# Patient Record
Sex: FEMALE | Race: WHITE
[De-identification: names, ages, dates, MRNs, and addresses within clinical notes are randomized per-mention and may not be internally consistent; named-entity substitution may affect disease eponyms.]

---

## 2017-03-02 ENCOUNTER — HOSPITAL ENCOUNTER (OUTPATIENT)
Dept: HOSPITAL 62 - WI | Age: 57
End: 2017-03-02
Attending: NURSE PRACTITIONER
Payer: COMMERCIAL

## 2017-03-02 DIAGNOSIS — Z12.31: Primary | ICD-10-CM

## 2017-03-02 PROCEDURE — G0202 SCR MAMMO BI INCL CAD: HCPCS

## 2017-03-02 PROCEDURE — 77067 SCR MAMMO BI INCL CAD: CPT

## 2017-10-14 ENCOUNTER — HOSPITAL ENCOUNTER (EMERGENCY)
Dept: HOSPITAL 62 - ER | Age: 57
Discharge: HOME | End: 2017-10-14
Payer: COMMERCIAL

## 2017-10-14 VITALS — DIASTOLIC BLOOD PRESSURE: 67 MMHG | SYSTOLIC BLOOD PRESSURE: 138 MMHG

## 2017-10-14 DIAGNOSIS — R07.9: ICD-10-CM

## 2017-10-14 DIAGNOSIS — I25.10: ICD-10-CM

## 2017-10-14 DIAGNOSIS — Z88.8: ICD-10-CM

## 2017-10-14 DIAGNOSIS — G89.29: ICD-10-CM

## 2017-10-14 DIAGNOSIS — M72.2: ICD-10-CM

## 2017-10-14 DIAGNOSIS — M54.89: ICD-10-CM

## 2017-10-14 DIAGNOSIS — R51: ICD-10-CM

## 2017-10-14 DIAGNOSIS — R10.816: ICD-10-CM

## 2017-10-14 DIAGNOSIS — J18.1: Primary | ICD-10-CM

## 2017-10-14 DIAGNOSIS — R74.8: ICD-10-CM

## 2017-10-14 DIAGNOSIS — Z79.891: ICD-10-CM

## 2017-10-14 DIAGNOSIS — Z87.19: ICD-10-CM

## 2017-10-14 DIAGNOSIS — F17.200: ICD-10-CM

## 2017-10-14 LAB
ALBUMIN SERPL-MCNC: 4.3 G/DL (ref 3.5–5)
ALP SERPL-CCNC: 48 U/L (ref 38–126)
ALT SERPL-CCNC: 39 U/L (ref 9–52)
ANION GAP SERPL CALC-SCNC: 11 MMOL/L (ref 5–19)
AST SERPL-CCNC: 26 U/L (ref 14–36)
BASOPHILS # BLD AUTO: 0.1 10^3/UL (ref 0–0.2)
BASOPHILS NFR BLD AUTO: 0.8 % (ref 0–2)
BILIRUB DIRECT SERPL-MCNC: 0.3 MG/DL (ref 0–0.4)
BILIRUB SERPL-MCNC: 0.3 MG/DL (ref 0.2–1.3)
BUN SERPL-MCNC: 14 MG/DL (ref 7–20)
CALCIUM: 9.9 MG/DL (ref 8.4–10.2)
CHLORIDE SERPL-SCNC: 107 MMOL/L (ref 98–107)
CO2 SERPL-SCNC: 24 MMOL/L (ref 22–30)
CREAT SERPL-MCNC: 0.84 MG/DL (ref 0.52–1.25)
EOSINOPHIL # BLD AUTO: 0.2 10^3/UL (ref 0–0.6)
EOSINOPHIL NFR BLD AUTO: 2.2 % (ref 0–6)
ERYTHROCYTE [DISTWIDTH] IN BLOOD BY AUTOMATED COUNT: 13.4 % (ref 11.5–14)
GLUCOSE SERPL-MCNC: 110 MG/DL (ref 75–110)
HCT VFR BLD CALC: 38.5 % (ref 36–47)
HGB BLD-MCNC: 13 G/DL (ref 12–15.5)
HGB HCT DIFFERENCE: 0.5
LIPASE SERPL-CCNC: 398.9 U/L (ref 23–300)
LYMPHOCYTES # BLD AUTO: 3.1 10^3/UL (ref 0.5–4.7)
LYMPHOCYTES NFR BLD AUTO: 39 % (ref 13–45)
MCH RBC QN AUTO: 30.1 PG (ref 27–33.4)
MCHC RBC AUTO-ENTMCNC: 33.9 G/DL (ref 32–36)
MCV RBC AUTO: 89 FL (ref 80–97)
MONOCYTES # BLD AUTO: 0.4 10^3/UL (ref 0.1–1.4)
MONOCYTES NFR BLD AUTO: 4.9 % (ref 3–13)
NEUTROPHILS # BLD AUTO: 4.2 10^3/UL (ref 1.7–8.2)
NEUTS SEG NFR BLD AUTO: 53.1 % (ref 42–78)
POTASSIUM SERPL-SCNC: 4.7 MMOL/L (ref 3.6–5)
PROT SERPL-MCNC: 7.1 G/DL (ref 6.3–8.2)
RBC # BLD AUTO: 4.33 10^6/UL (ref 3.72–5.28)
SODIUM SERPL-SCNC: 142.2 MMOL/L (ref 137–145)
WBC # BLD AUTO: 8 10^3/UL (ref 4–10.5)

## 2017-10-14 PROCEDURE — 36415 COLL VENOUS BLD VENIPUNCTURE: CPT

## 2017-10-14 PROCEDURE — 71020: CPT

## 2017-10-14 PROCEDURE — 84484 ASSAY OF TROPONIN QUANT: CPT

## 2017-10-14 PROCEDURE — 93010 ELECTROCARDIOGRAM REPORT: CPT

## 2017-10-14 PROCEDURE — 99284 EMERGENCY DEPT VISIT MOD MDM: CPT

## 2017-10-14 PROCEDURE — 85025 COMPLETE CBC W/AUTO DIFF WBC: CPT

## 2017-10-14 PROCEDURE — 80053 COMPREHEN METABOLIC PANEL: CPT

## 2017-10-14 PROCEDURE — 93005 ELECTROCARDIOGRAM TRACING: CPT

## 2017-10-14 PROCEDURE — 83690 ASSAY OF LIPASE: CPT

## 2017-10-14 NOTE — RADIOLOGY REPORT (SQ)
EXAM DESCRIPTION:  CHEST PA/LAT



COMPLETED DATE/TIME:  10/14/2017 9:56 am



REASON FOR STUDY:  cp



COMPARISON:  Chest film 2/19/2012, 5/12/2014, 6/20/2015

CT chest 5/9/2014



EXAM PARAMETERS:  NUMBER OF VIEWS: two views

TECHNIQUE: Digital Frontal and Lateral radiographic views of the chest acquired.

RADIATION DOSE: NA

LIMITATIONS: none



FINDINGS:  LUNGS AND PLEURA: Mild airspace disease in the medial segment right middle lobe, marked wi
th a [place] on the frontal and lateral view.

Minimal lingular scarring or atelectasis.

No pleural effusion.  No pneumothorax.

MEDIASTINUM AND HILAR STRUCTURES: Densely calcified left hilar and sub- carinal lymph nodes.  These a
re stable dating back to 2012

HEART AND VASCULAR STRUCTURES: Heart normal size.  No evidence for failure.

BONES: No acute findings.

HARDWARE: Clips right upper quadrant post cholecystectomy

OTHER: No other significant finding.



IMPRESSION:  Airspace disease with air bronchograms in the medial segment right middle lobe.  This is
 worrisome for pneumonia.



TECHNICAL DOCUMENTATION:  JOB ID:  [id number]

 2011 [employer]- All Rights Reserved

## 2017-10-14 NOTE — EKG REPORT
SEVERITY:- ABNORMAL ECG -

SINUS RHYTHM

LAD, CONSIDER LEFT ANTERIOR FASCICULAR BLOCK

LOW VOLTAGE IN FRONTAL LEADS

:

Confirmed by: Dudley White MD 14-Oct-2017 11:01:29

## 2017-10-14 NOTE — ER DOCUMENT REPORT
ED General





- General


Chief Complaint: Shortness Of Breath


Stated Complaint: BACK PAIN


Time Seen by Provider: 10/14/17 09:18


Mode of Arrival: Ambulatory


Notes: 





56 yo smoker, non htn, non dm, hyperlipidemic, non etoh, non drugs, female c/o 

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 

makes it worse, tums without relief, thought it was heartburn, radiates through 

to the back since tuesday, level 4/5. Breathing more shallow. Similar pain in 

2002, dx "pulled chest muscles". Also c/o headache for 3 weeks. Never had 

cardiac work up. 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 PCP: Asmita, pain 

management  seen on friday for DD, chronic back pain, plantar fasciitis (

hydrocodone 10mg tid) Fam hx: no CAD. Hx: cholecystectomy, diverticulitis, GERD

, chronic nausea.


TRAVEL OUTSIDE OF THE [place] IN LAST 30 DAYS: No





- Related Data


Allergies/Adverse Reactions: 


 





NSAIDS (Non-Steroidal Anti-Inflamma [Nsaids] Allergy (Severe, Verified 10/14/17 

09:10)


 gastric bleeding


prednisone [Prednisone] Adverse Reaction (Mild, Verified 10/14/17 09:10)


 Nausea











Past Medical History





- General


Information source: Patient





- Social History


Smoking Status: Current Every Day Smoker


Chew tobacco use (# tins/day): No


Frequency of alcohol use: None


Drug Abuse: None


Family History: None





- Past Medical History


Cardiac Medical History: Reports: Hx Coronary Artery Disease - hyperlipedemia, 

Hx Hypercholesterolemia


   Denies: Hx Heart Attack, Hx Hypertension


Pulmonary Medical History: 


   Denies: Hx Asthma, Hx Bronchitis, Hx COPD, Hx Pneumonia, Hx Tuberculosis


Neurological Medical History: Reports: Hx Migraine.  Denies: Hx Cerebrovascular 

Accident, Hx Seizures


Renal/ Medical History: Denies: Hx Peritoneal Dialysis


GI Medical History: Reports: Hx Gastroesophageal Reflux Disease


Musculoskeltal Medical History: Denies Hx Arthritis, Reports Hx Fibromyalgia


Psychiatric Medical History: Reports: Hx Anxiety, Hx Depression


Infectious Medical History: Reports: Hx C-Diff


Past Surgical History: Reports: Hx Cholecystectomy, Hx Hysterectomy, Hx 

Orthopedic Surgery - carpal tunnel x2, Hx Tonsillectomy.  Denies: Hx 

Appendectomy, Hx Pacemaker





- Immunizations


Immunizations up to date: Yes


Hx Diphtheria, Pertussis, Tetanus Vaccination: Yes - 01/01/2010


Hx Pneumococcal Vaccination: 01/01/13





Review of Systems





- Review of Systems


Constitutional: No symptoms reported


EENT: No symptoms reported


Cardiovascular: See HPI


Respiratory: No symptoms reported


Gastrointestinal: No symptoms reported


Genitourinary: No symptoms reported


Female Genitourinary: No symptoms reported


Musculoskeletal: See HPI


Skin: No symptoms reported


Hematologic/Lymphatic: No symptoms reported


Neurological/Psychological: No symptoms reported





Physical Exam





- Vital signs


Vitals: 


 











Temp Pulse Resp BP Pulse Ox


 


 99.2 F   86   15   130/87 H  95 


 


 10/14/17 09:10  10/14/17 09:10  10/14/17 09:10  10/14/17 09:10  10/14/17 09:10











Interpretation: Normal





- General


General appearance: Appears well, Alert





- HEENT


Head: Normocephalic, Atraumatic


Eyes: Normal


Conjunctiva: Normal


Pupils: PERRL


Neck: Supple.  No: Lymphadenopathy





- Respiratory


Respiratory status: No respiratory distress


Chest status: Tender - midline setrnum and right mid chestwall


Breath sounds: Normal


Chest palpation: Normal





- Cardiovascular


Rhythm: Regular


Heart sounds: Normal auscultation


Murmur: No





- Abdominal


Inspection: Normal


Distension: No distension


Bowel sounds: Normal


Tenderness: Nontender


Organomegaly: No organomegaly





- Back


Back: Normal, Nontender





- Extremities


General upper extremity: Normal inspection, Nontender, Normal color, Normal ROM

, Normal temperature


General lower extremity: Normal inspection, Nontender, Normal color, Normal ROM

, Normal temperature, Normal weight bearing.  No: Renate's sign





- Neurological


Neuro grossly intact: Yes


Cognition: Normal


Orientation: AAOx4


[place] Coma Scale Eye Opening: Spontaneous


[place] Coma Scale Verbal: Oriented


[place] Coma Scale Motor: Obeys Commands


[place] Coma Scale Total: 15


Speech: Normal


Motor strength normal: LUE, RUE, LLE, RLE


Sensory: Normal





- Psychological


Associated symptoms: Normal affect, Normal mood





- Skin


Skin Temperature: Warm


Skin Moisture: Dry


Skin Color: Normal





Course





- Re-evaluation


Re-evalutation: 





10/14/17 12:07


Consult Dr. Varma related to this patient.  In 2014 she had a CTA which showed 

her aorta as normal.  He feels like based on the history and physical exam that 

this is musculoskeletal right sided chest, right middle pneumonia,  and savita 

scapular pain and she can follow-up with her family practice doctor for the 

mildly elevated lipase.  The troponin is negative the EKG is normal sinus 

rhythm without ectopy or acute changes, the chest x-ray shows possible right 

middle lobe pneumonia which we will treat as well.


10/14/17 12:25


pt has had panccreatitis in the past, but she does not think it is that today,





- Vital Signs


Vital signs: 


 











Temp Pulse Resp BP Pulse Ox


 


 99.5 F   85   20   138/67 H  99 


 


 10/14/17 13:07  10/14/17 13:07  10/14/17 13:07  10/14/17 13:07  10/14/17 13:07














- Laboratory


Result Diagrams: 


 10/14/17 09:41





 10/14/17 09:41


Laboratory results interpreted by me: 


 











  10/14/17





  09:41


 


Lipase  398.9 H














Discharge





- Discharge


Clinical Impression: 


 periscapular back pain, Chest wall tenderness, Elevated lipase, mild 

epigastric tenderness





Right middle lobe pneumonia


Qualifiers:


 Pneumonia type: due to unspecified organism Qualified Code(s): J18.1 - Lobar 

pneumonia, unspecified organism





Condition: Good


Disposition: HOME, SELF-CARE


Instructions:  Chest Wall Pain ([facility]), Levofloxacin, Pneumonia ([facility]), Stop 

Smoking ([facility])


Additional Instructions: 


see your doctor for follow up , copy of labs given to you. the lipase is mildly 

elevated


to er if worse


stop smoking


warm compress to sore muscles





Please complete the patient satisfaction survey if you get one, and return it.. 

If you do not receive a survey,  then you can go to the [facility] website, [email or web address] 

and place your comments about your very good care. Thank you very much. It was 

a pleasure being your medical provider today.


Prescriptions: 


Fluconazole [Diflucan] 150 mg PO ONCE PRN #1 tablet


 PRN Reason: 


Levofloxacin 750 mg PO DAILY #6 tablet


Referrals: 


BEVERLY SIMMONS MD [Primary Care Provider] - 10/16/17

## 2017-10-14 NOTE — ER DOCUMENT REPORT
ED Medical Screen (RME)





- General


Chief Complaint: Shortness Of Breath


Stated Complaint: BACK PAIN


Time Seen by Provider: 10/14/17 09:18


Mode of Arrival: Ambulatory


Information source: Patient


TRAVEL OUTSIDE OF THE [place] IN LAST 30 DAYS: No





- HPI


Patient complains to provider of: Right-sided chest pain radiating into back


Onset: Yesterday


Notes: 





10/14/17 09:25


Patient is a 57-year-old female presenting to the emergency room today 

complaining of right-sided chest pain that radiates into her back, it started 

yesterday, she reports it hurts to take a deep breath, and therefore she feels 

short of breath, she denies any cough, cold or congestion, no nausea, vomiting 

or diarrhea, no fever or chills, patient is a smoker and reports a history of 

"spot on my lung"





- Related Data


Allergies/Adverse Reactions: 


 





NSAIDS (Non-Steroidal Anti-Inflamma [Nsaids] Allergy (Severe, Verified 10/14/17 

09:10)


 gastric bleeding


prednisone [Prednisone] Adverse Reaction (Mild, Verified 10/14/17 09:10)


 Nausea











Past Medical History





- Past Medical History


Cardiac Medical History: Reports: Hx Coronary Artery Disease - hyperlipedemia, 

Hx Hypercholesterolemia


   Denies: Hx Heart Attack, Hx Hypertension


Pulmonary Medical History: 


   Denies: Hx Asthma, Hx Bronchitis, Hx COPD, Hx Pneumonia, Hx Tuberculosis


Neurological Medical History: Reports: Hx Migraine.  Denies: Hx Cerebrovascular 

Accident, Hx Seizures


Renal/ Medical History: Denies: Hx Peritoneal Dialysis


GI Medical History: Reports: Hx Gastroesophageal Reflux Disease


Musculoskeltal Medical History: Denies Hx Arthritis, Reports Hx Fibromyalgia


Psychiatric Medical History: Reports: Hx Anxiety, Hx Depression


Infectious Medical History: Reports: Hx C-Diff


Past Surgical History: Reports: Hx Cholecystectomy, Hx Hysterectomy, Hx 

Orthopedic Surgery - carpal tunnel x2, Hx Tonsillectomy.  Denies: Hx 

Appendectomy, Hx Pacemaker





- Immunizations


Immunizations up to date: Yes


Hx Diphtheria, Pertussis, Tetanus Vaccination: Yes - 01/01/2010





Physical Exam





- Vital signs


Vitals: 





 











Temp Pulse Resp BP Pulse Ox


 


 99.2 F   86   15   130/87 H  95 


 


 10/14/17 09:10  10/14/17 09:10  10/14/17 09:10  10/14/17 09:10  10/14/17 09:10














Course





- Vital Signs


Vital signs: 





 











Temp Pulse Resp BP Pulse Ox


 


 99.2 F   86   15   130/87 H  95 


 


 10/14/17 09:10  10/14/17 09:10  10/14/17 09:10  10/14/17 09:10  10/14/17 09:10

## 2018-07-13 ENCOUNTER — HOSPITAL ENCOUNTER (OUTPATIENT)
Dept: HOSPITAL 62 - OD | Age: 58
End: 2018-07-13
Attending: INTERNAL MEDICINE
Payer: COMMERCIAL

## 2018-07-13 ENCOUNTER — HOSPITAL ENCOUNTER (OUTPATIENT)
Dept: HOSPITAL 62 - WI | Age: 58
End: 2018-07-13
Attending: INTERNAL MEDICINE
Payer: COMMERCIAL

## 2018-07-13 DIAGNOSIS — D64.9: ICD-10-CM

## 2018-07-13 DIAGNOSIS — Z12.31: Primary | ICD-10-CM

## 2018-07-13 DIAGNOSIS — E78.00: Primary | ICD-10-CM

## 2018-07-13 LAB
ADD MANUAL DIFF: NO
ALBUMIN SERPL-MCNC: 4.3 G/DL (ref 3.5–5)
ALP SERPL-CCNC: 52 U/L (ref 38–126)
ALT SERPL-CCNC: 52 U/L (ref 9–52)
ANION GAP SERPL CALC-SCNC: 12 MMOL/L (ref 5–19)
AST SERPL-CCNC: 35 U/L (ref 14–36)
BASOPHILS # BLD AUTO: 0.1 10^3/UL (ref 0–0.2)
BASOPHILS NFR BLD AUTO: 0.6 % (ref 0–2)
BILIRUB DIRECT SERPL-MCNC: 0.4 MG/DL (ref 0–0.4)
BILIRUB SERPL-MCNC: 0.7 MG/DL (ref 0.2–1.3)
BUN SERPL-MCNC: 17 MG/DL (ref 7–20)
CALCIUM: 9.9 MG/DL (ref 8.4–10.2)
CHLORIDE SERPL-SCNC: 104 MMOL/L (ref 98–107)
CO2 SERPL-SCNC: 27 MMOL/L (ref 22–30)
EOSINOPHIL # BLD AUTO: 0.2 10^3/UL (ref 0–0.6)
EOSINOPHIL NFR BLD AUTO: 2 % (ref 0–6)
ERYTHROCYTE [DISTWIDTH] IN BLOOD BY AUTOMATED COUNT: 13.6 % (ref 11.5–14)
GLUCOSE SERPL-MCNC: 94 MG/DL (ref 75–110)
HCT VFR BLD CALC: 37.2 % (ref 36–47)
HGB BLD-MCNC: 12.9 G/DL (ref 12–15.5)
LYMPHOCYTES # BLD AUTO: 2.8 10^3/UL (ref 0.5–4.7)
LYMPHOCYTES NFR BLD AUTO: 31.7 % (ref 13–45)
MCH RBC QN AUTO: 30.7 PG (ref 27–33.4)
MCHC RBC AUTO-ENTMCNC: 34.6 G/DL (ref 32–36)
MCV RBC AUTO: 89 FL (ref 80–97)
MONOCYTES # BLD AUTO: 0.6 10^3/UL (ref 0.1–1.4)
MONOCYTES NFR BLD AUTO: 7 % (ref 3–13)
NEUTROPHILS # BLD AUTO: 5.1 10^3/UL (ref 1.7–8.2)
NEUTS SEG NFR BLD AUTO: 58.7 % (ref 42–78)
PLATELET # BLD: 306 10^3/UL (ref 150–450)
POTASSIUM SERPL-SCNC: 4.2 MMOL/L (ref 3.6–5)
PROT SERPL-MCNC: 7.2 G/DL (ref 6.3–8.2)
RBC # BLD AUTO: 4.2 10^6/UL (ref 3.72–5.28)
SODIUM SERPL-SCNC: 142.6 MMOL/L (ref 137–145)
TOTAL CELLS COUNTED % (AUTO): 100 %
WBC # BLD AUTO: 8.7 10^3/UL (ref 4–10.5)

## 2018-07-13 PROCEDURE — 80053 COMPREHEN METABOLIC PANEL: CPT

## 2018-07-13 PROCEDURE — 85025 COMPLETE CBC W/AUTO DIFF WBC: CPT

## 2018-07-13 PROCEDURE — 77063 BREAST TOMOSYNTHESIS BI: CPT

## 2018-07-13 PROCEDURE — 36415 COLL VENOUS BLD VENIPUNCTURE: CPT

## 2018-07-13 PROCEDURE — 77067 SCR MAMMO BI INCL CAD: CPT

## 2018-07-13 NOTE — WOMENS IMAGING REPORT
EXAM DESCRIPTION:  3D SCREENING MAMMO BILAT



COMPLETED DATE/TIME:  7/13/2018 8:16 am



REASON FOR STUDY:  SCREENING MAMMO Z12.31  ENCNTR SCREEN MAMMOGRAM FOR MALIGNANT NEOPLASM OF GENA



COMPARISON:  March 2017 and August 2014



TECHNIQUE:  Standard craniocaudal and mediolateral oblique views of each breast recorded using digita
l acquisition and breast tomosynthesis.



LIMITATIONS:  None.



FINDINGS:  No masses, calcifications or architectural distortion. No areas of suspicion.

Read with the assistance of CAD.

.[facility] - R2 Cenova Version 1.3

.[facility] - R2 Cenova Version 1.3

.[facility] - R2 Cenova Version 2.4

[place] 2.4

.[facility] - R2  Version 9.2



IMPRESSION:  NORMAL MAMMOGRAM.  BIRADS 1.



BREAST DENSITY:  b. There are scattered areas of fibroglandular density.



BIRAD:  1 NEGATIVE



RECOMMENDATION:  ROUTINE SCREENING



COMMENT:  The patient has been notified of the results by letter per SA requirements. Additional no
tification policies are in place for contacting patient with suspicious or incomplete findings.

Quality ID #[id number]: The American College of Radiology recommends an annual screening mammogram for women
 aged 40 years or over. This facility utilizes a reminder system to ensure that all patients receive 
reminder letters, and/or direct phone calls for appointments. This includes reminders for routine scr
eening mammograms, diagnostic mammograms, or other Breast Imaging Interventions when appropriate.  Th
is patient will be placed in the appropriate reminder system.

The American College of Radiology (ACR) has developed recommendations for screening MRI of the breast
s in certain patient populations, to be used in conjunction with mammography.  Breast MRI surveillanc
e may be appropriate for women with more than 20% lifetime risk of developing breast cancer  as deter
mined by genetic testing, significant family history of the disease, or history of mantle radiation f
or Hodgkins Disease.  ACR Practice Guidelines 2008.

[employer]



PQRS 6045F: Fluoroscopic imaging is not utilized for breast tomosynthesis.



TECHNICAL DOCUMENTATION:  FINDING NUMBER: (1)

ASSESSMENT:  (1)

JOB ID:  [id number]

 2011 [employer]- All Rights Reserved



Reading location - IP/workstation name: [facility]

## 2018-07-17 ENCOUNTER — HOSPITAL ENCOUNTER (OUTPATIENT)
Dept: HOSPITAL 62 - OD | Age: 58
End: 2018-07-17
Attending: INTERNAL MEDICINE
Payer: COMMERCIAL

## 2018-07-17 DIAGNOSIS — E78.00: Primary | ICD-10-CM

## 2018-07-17 DIAGNOSIS — D64.9: ICD-10-CM

## 2018-07-17 LAB
CHOLEST SERPL-MCNC: 202.06 MG/DL (ref 0–200)
LDLC SERPL DIRECT ASSAY-MCNC: 98 MG/DL (ref ?–100)
TRIGL SERPL-MCNC: 210 MG/DL (ref ?–150)
VLDLC SERPL CALC-MCNC: 42 MG/DL (ref 10–31)

## 2018-07-17 PROCEDURE — 36415 COLL VENOUS BLD VENIPUNCTURE: CPT

## 2018-07-17 PROCEDURE — 80061 LIPID PANEL: CPT

## 2021-01-23 ENCOUNTER — HOSPITAL ENCOUNTER (OUTPATIENT)
Dept: HOSPITAL 62 - EMPHEALTH | Age: 61
End: 2021-01-23
Attending: INTERNAL MEDICINE
Payer: COMMERCIAL

## 2021-01-23 DIAGNOSIS — Z23: Primary | ICD-10-CM

## 2021-01-23 PROCEDURE — 91300: CPT
